# Patient Record
(demographics unavailable — no encounter records)

---

## 2025-07-15 NOTE — ASSESSMENT
[FreeTextEntry1] : Obesity management BMI 38 - 79-year-old female patient presenting with concerns about weight and pre-diabetes. Patient reports struggling with weight for the past 5-6 years, particularly since [facility].and mutliple family traumas and work trauma all at once.  Patient has a history of valvular heart disease and open heart surgery. Patient reports no current exercise routine beyond physical therapy twice a week.  Highest adult weight reported as 216 lbs, with lowest adult weight at 120 lbs. Patient is pre-diabetic with an A1C of 5.9%. Patient reports a large breakfast as her main meal, often skipping lunch. Patient consumes red meat daily and has limited vegetable intake. Her  says she eats very large meals. Patient attends physical therapy twice a week but does not engage in additional exercise. Prozac (for depression), Risperidone, Famotidine, medication for heart (unspecified), medication for constipation  Weight gain is likely multifactorial, including decreased activity post-[facility], dietary habits high in carbohydrates and red meat, and possible emotional eating related to past trauma. Patient's current lifestyle and dietary habits are putting her at risk for developing type 2 diabetes and potential cardiovascular complications.  Plan: - Summary : Comprehensive weight management plan including dietary modifications, exercise regimen, and medication. Focus on preventing progression to type 2 diabetes and improving overall health. - Plan : - Initiate Metformin ER: Start with 1 tablet with dinner for one week, then increase to 2 tablets if tolerated.  Patient is not eligible for GLP-1 due to Medicare and not having a heart attack of sleep apnea  - Dietary modifications: Implement plate method (1/2 plate vegetables, 1/4 plate protein, 1/4 plate carbohydrates), reduce fruit intake, limit red meat to twice weekly, increase vegetable intake  - Exercise plan: Continue PT twice weekly, add water aerobics once weekly if possible, daily 15-minute walks  - Increase fluid intake to 64 ounces per day  - Behavioral modifications: Address emotional eating, implement stress management techniques  - Follow-up in 4 weeks to assess progress and adjust plan as needed  -- Encourage self-care activities to improve overall well-being

## 2025-07-15 NOTE — HISTORY OF PRESENT ILLNESS
[FreeTextEntry1] : Here for initial Medical weight management consultation    Medical Hx:  Obesity Hypertension CAD/valvular disease Hyperlipidemia Depression  Surgical Hx:  Appendectomy Valvular repair Family Hx:  Cancer:  Father and Mother: colon cancer and lung cancer Grandfather type 2 diabetes  Started struggling with weight  Struggling 5-6 years Past Wt Loss Attempts:  Watching carbs unsuccessful Highest Adult Wt:  216 Lowest Adult Wt: 120 Goal:  150-160 Dietary pattern:  Breakfast:  all lot of fruit, bread, cheese, tomatoes, eggs: recommendations cut fruit amount in, advocado, grapes, and apples.  Lunch:  veggies spinach and carrots.  4 ounces of chicken or turkey Snack Ensure Max shakes. Can have ice cream 1/2 cup 1x per week.   Dinner:  Hamburger, Steak, Chicken:   Limit red meat 2x per week. Chicken or Turkey,  Snacks:  No Exercise.  Goes PT 2x week.  Walk 15 minutes, [facility] at [facility] Water Intake: 64 ounces of fluid per day Exercise Plan: PT twice per week.  Water aerobics 1x per week add.  Walking 15 minutes daily. Sleep: 7-8 hours Stress Level: NO Gyn: Menopausal Social Hx:     Retired   No children Labs:  6/2025 A1C 5.9% Tchol 171    HDL 56  H/h wnl  EGFR 67